# Patient Record
Sex: MALE | RURAL
[De-identification: names, ages, dates, MRNs, and addresses within clinical notes are randomized per-mention and may not be internally consistent; named-entity substitution may affect disease eponyms.]

---

## 2023-02-10 ENCOUNTER — TELEPHONE (OUTPATIENT)
Dept: CARDIOLOGY | Facility: CLINIC | Age: 68
End: 2023-02-10

## 2023-02-10 NOTE — TELEPHONE ENCOUNTER
Received voicemail on 2/6/23 at 11:43am:    Patient left message stating he was supposed to get in touch with Dr. Ora Elias. States it's not for an appointment. There are some sheets he is supposed to bring in some time this month for her to look at. He can be reached at [phone].

## 2023-03-15 ENCOUNTER — TELEPHONE (OUTPATIENT)
Dept: CARDIOLOGY | Facility: CLINIC | Age: 68
End: 2023-03-15

## 2023-03-15 NOTE — TELEPHONE ENCOUNTER
[facility] faxed over a request for a clearance on Eliquis for patients upcoming colonoscopy. Request has been scanned into patients chart.

## 2023-03-15 NOTE — TELEPHONE ENCOUNTER
Patient has had abnormal stress test in 2020.  It was mildly abnormal but I would still like to ensure there is no new symptoms or no new EKG changes prior to clearance.  Please bring in for visit

## 2023-04-13 ENCOUNTER — OFFICE VISIT (OUTPATIENT)
Dept: CARDIOLOGY | Facility: CLINIC | Age: 68
End: 2023-04-13
Payer: MEDICARE

## 2023-04-13 VITALS
SYSTOLIC BLOOD PRESSURE: 128 MMHG | OXYGEN SATURATION: 94 % | DIASTOLIC BLOOD PRESSURE: 72 MMHG | BODY MASS INDEX: 35.01 KG/M2 | HEART RATE: 83 BPM | WEIGHT: 231 LBS | HEIGHT: 68 IN

## 2023-04-13 DIAGNOSIS — G47.33 OBSTRUCTIVE SLEEP APNEA: ICD-10-CM

## 2023-04-13 DIAGNOSIS — Z79.01 CURRENT USE OF LONG TERM ANTICOAGULATION: ICD-10-CM

## 2023-04-13 DIAGNOSIS — I25.10 CORONARY ARTERY DISEASE INVOLVING NATIVE CORONARY ARTERY OF NATIVE HEART WITHOUT ANGINA PECTORIS: ICD-10-CM

## 2023-04-13 DIAGNOSIS — I10 HYPERTENSION, ESSENTIAL: ICD-10-CM

## 2023-04-13 DIAGNOSIS — Z01.810 PRE-OPERATIVE CARDIOVASCULAR EXAMINATION: Primary | ICD-10-CM

## 2023-04-13 RX ORDER — POTASSIUM CHLORIDE 1500 MG/1
1 TABLET, EXTENDED RELEASE ORAL DAILY
COMMUNITY
Start: 2023-03-13

## 2023-04-13 RX ORDER — METOPROLOL SUCCINATE 25 MG/1
1 TABLET, EXTENDED RELEASE ORAL DAILY
COMMUNITY
Start: 2023-02-26

## 2023-04-13 RX ORDER — FLUTICASONE FUROATE, UMECLIDINIUM BROMIDE AND VILANTEROL TRIFENATATE 100; 62.5; 25 UG/1; UG/1; UG/1
1 POWDER RESPIRATORY (INHALATION) DAILY
COMMUNITY
Start: 2023-03-20

## 2023-04-13 RX ORDER — MONTELUKAST SODIUM 10 MG/1
1 TABLET ORAL DAILY
COMMUNITY
Start: 2023-03-20

## 2023-04-13 RX ORDER — SIMVASTATIN 80 MG
1 TABLET ORAL DAILY
COMMUNITY
Start: 2023-01-26

## 2023-04-13 RX ORDER — APIXABAN 5 MG/1
1 TABLET, FILM COATED ORAL EVERY 12 HOURS SCHEDULED
COMMUNITY
Start: 2023-03-17 | End: 2023-04-19 | Stop reason: SDUPTHER

## 2023-04-13 RX ORDER — ALBUTEROL SULFATE 90 UG/1
AEROSOL, METERED RESPIRATORY (INHALATION)
COMMUNITY
Start: 2023-02-10

## 2023-04-13 RX ORDER — FUROSEMIDE 40 MG/1
1 TABLET ORAL DAILY
COMMUNITY
Start: 2023-02-16

## 2023-04-13 NOTE — LETTER
2023       No Recipients    Patient: Lance Madden   YOB: 1955   Date of Visit: 2023       Dear Dr. Luna Recipients:    Thank you for referring Lance Madden to me for evaluation. Below are the relevant portions of my assessment and plan of care.    If you have questions, please do not hesitate to call me. I look forward to following Lance along with you.         Sincerely,        Kassie Elias MD        CC:   No Recipients    Kassie Elias MD  [phone]  Sign when Signing Visit      [facility] Provider Note     Date:   2023   Name: Lance Madden  :   1955  PCP: Jens Perez MD    Chief Complaint   Patient presents with   • Cardiac Clearance       Subjective     History of Present Illness  Lacne Madden is a 67 y.o. male who presents today for cardiac evaluation prior to several procedures. He is having a colonoscopy on Monday and has been told he should hold his Eliquis for 2 days. He also is planning up coming dental extractions. He denies chest pain or shortness of breath. He has not been using his Bipap because he feels like it winston him. We discussed titration study but he will not do this he says because of time and cost. He wants to just stop using bipap. I discussed long term risks of untreated CARMELA including a fib, distolic CHF, cognitive impairment. But he still is not willing. He does say he has home O2 that he will start wearing at night, but I did remind him that nasal O2 cannot get past an oral obstruction.     Cardiology/Sleep History  1. Suspected CAD with abnormal stress test -fixed inferior defect on several nuclear stress tests, last  at [facility]  2. CARMELA with AHI 55 on HST 2018 - on bipap but mostly intolerant and wearning night O2  3. DVT/PE - 2016, unprovoked  4. COPD  5. DMT2  6. Echo 2022 - Ef 55-60%, RVSP 45mmHg    Allergies   Allergen Reactions   • Valacyclovir Hives   • Penicillins Other  "(See Comments)     Patient is unknown. It's been awhile since he's taken it.       Current Outpatient Medications:   •  albuterol sulfate  (90 Base) MCG/ACT inhaler, , Disp: , Rfl:   •  Eliquis 5 MG tablet tablet, Take 1 tablet by mouth Every 12 (Twelve) Hours., Disp: , Rfl:   •  furosemide (LASIX) 40 MG tablet, Take 1 tablet by mouth Daily., Disp: , Rfl:   •  KLOR-CON 20 MEQ CR tablet, Take 1 tablet by mouth Daily., Disp: , Rfl:   •  metFORMIN (GLUCOPHAGE) 500 MG tablet, Take 1 tablet by mouth Daily., Disp: , Rfl:   •  metoprolol succinate XL (TOPROL-XL) 25 MG 24 hr tablet, Take 1 tablet by mouth Daily., Disp: , Rfl:   •  montelukast (SINGULAIR) 10 MG tablet, Take 1 tablet by mouth Daily., Disp: , Rfl:   •  simvastatin (ZOCOR) 80 MG tablet, Take 1 tablet by mouth Daily., Disp: , Rfl:   •  Trelegy Ellipta 100-62.5-25 MCG/ACT inhaler, Inhale 1 puff Daily., Disp: , Rfl:     Past Medical History:   Diagnosis Date   • COPD (chronic obstructive pulmonary disease)    • DM (diabetes mellitus) 2022   • DVT (deep venous thrombosis) 2016   • Hypercholesterolemia    • CARMELA (obstructive sleep apnea)       History reviewed. No pertinent surgical history.  Family History   Problem Relation Age of Onset   • Alzheimer's disease Mother    • COPD Father      Social History     Socioeconomic History   • Marital status:    Tobacco Use   • Smoking status: Never   Substance and Sexual Activity   • Alcohol use: Never       Objective     Vital Signs:  /72 (BP Location: Left arm, Patient Position: Sitting)   Pulse 83   Ht 172.7 cm (68\")   Wt 105 kg (231 lb)   SpO2 94%   BMI 35.12 kg/m²   Estimated body mass index is 35.12 kg/m² as calculated from the following:    Height as of this encounter: 172.7 cm (68\").    Weight as of this encounter: 105 kg (231 lb).       Class 2 Severe Obesity (BMI >=35 and <=39.9). Obesity-related health conditions include the following: obstructive sleep apnea, hypertension and coronary " heart disease. Obesity is unchanged. BMI is is above average; BMI management plan is completed. We discussed portion control and increasing exercise.      Physical Exam  Vitals reviewed.   Constitutional:       General: He is not in acute distress.     Appearance: Normal appearance.   HENT:      Head: Normocephalic and atraumatic.      Mouth/Throat:      Mouth: Mucous membranes are moist.   Eyes:      Conjunctiva/sclera: Conjunctivae normal.   Neck:      Vascular: No carotid bruit.   Cardiovascular:      Rate and Rhythm: Normal rate and regular rhythm.      Pulses: Normal pulses.      Heart sounds: Normal heart sounds. No murmur heard.  Pulmonary:      Effort: Pulmonary effort is normal. No respiratory distress.      Breath sounds: Normal breath sounds. No wheezing or rhonchi.   Abdominal:      General: Abdomen is flat.      Palpations: Abdomen is soft.   Musculoskeletal:      Cervical back: Normal range of motion and neck supple.      Right lower leg: No edema.      Left lower leg: No edema.   Skin:     General: Skin is warm and dry.      Coloration: Skin is not jaundiced.   Neurological:      General: No focal deficit present.      Mental Status: He is alert and oriented to person, place, and time. Mental status is at baseline.      GCS: GCS eye subscore is 4. GCS verbal subscore is 5. GCS motor subscore is 6.      Cranial Nerves: No cranial nerve deficit.      Motor: No weakness.      Gait: Gait normal.   Psychiatric:         Mood and Affect: Mood and affect normal. Mood is not anxious.         Speech: Speech normal.         Behavior: Behavior normal.                 ECG 12 Lead    Date/Time: 4/13/2023 3:37 PM  Performed by: Kassie Elias MD  Authorized by: Kassie Elias MD   Comparison: not compared with previous ECG   Previous ECG: no previous ECG available  Rhythm: sinus rhythm  Rate: normal  Conduction: incomplete right bundle branch block  ST Segments: ST segments normal  T Waves: T waves  normal  QRS axis: normal  Other: no other findings  Lead: right-sided leads used    Clinical impression: abnormal EKG             Assessment and Plan     Diagnoses and all orders for this visit:    1. Pre-operative cardiovascular examination (Primary)  Comments:     Assessment & Plan:  No chest pain or symptoms, stable nuclear stress 2020 and stable echo 11/2022, would proceed as planned. Procedures are low risk for MACE. Because of CARMELA would recommend significant monitoring capability with sedation. I have been asked to comment on his extractions being done in dental office, I think this would be best done all at once to minimize time off blood thinners and best done with more monitoring capabilities than the average dental office (ie pulse oxemetry, heart rate, blood pressure)    Orders:  -     ECG 12 Lead    2. Coronary artery disease involving native coronary artery of native heart without angina pectoris  Comments:  Possible, versus GI attenuation on stress testing. No indication for revascularization prior to procedures as he is asymptomatic. COntinue medical therapy.     3. Obstructive sleep apnea  Comments:  Declines to continue bipap, risks understood. Will wear night time O2. CARMELA precautions for anesthesia.     4. Hypertension, essential  Comments:  controlled    5. Current use of long term anticoagulation  Comments:  Stop 2 days prior to procedures, minimize time off of anticoagulation      Recommendations: ER if symptoms increase, Sleep hygiene discussed, Sleep risks reviewed (driving, medical, sleep death, sedating agents), Report if any new/changing symptoms immediately, Limitations of preop risk stratification reviewed, CARMELA precautions for anesthesia, Minimize time off anti-platelet/anti-coagulant and Do not hold beta-blockers in the saurabh-operative period          Follow Up  No follow-ups on file.  Patient was given instructions and counseling regarding his condition or for health maintenance advice.  Please see specific information pulled into the AVS if appropriate.

## 2023-04-13 NOTE — LETTER
2023     Tulio Sparks MD  [address] [facility] [address]    Patient: Lance Madden   YOB: 1955   Date of Visit: 2023       Dear Tulio Sparks MD,    Thank you for referring Lance Madden to me for evaluation. Below is a copy of my consult note.    If you have questions, please do not hesitate to call me. I look forward to following Lance along with you.         Sincerely,        Kassie Elias MD        CC: No Recipients        [facility] Provider Note     Date:   2023   Name: Lance Madden  :   1955  PCP: Jens Perez MD    Chief Complaint   Patient presents with   • Cardiac Clearance       Subjective     History of Present Illness  Lance Madden is a 67 y.o. male who presents today for cardiac evaluation prior to several procedures. He is having a colonoscopy on Monday and has been told he should hold his Eliquis for 2 days. He also is planning up coming dental extractions. He denies chest pain or shortness of breath. He has not been using his Bipap because he feels like it winston him. We discussed titration study but he will not do this he says because of time and cost. He wants to just stop using bipap. I discussed long term risks of untreated CARMELA including a fib, distolic CHF, cognitive impairment. But he still is not willing. He does say he has home O2 that he will start wearing at night, but I did remind him that nasal O2 cannot get past an oral obstruction.     Cardiology/Sleep History  1. Suspected CAD with abnormal stress test -fixed inferior defect on several nuclear stress tests, last  at [facility]  2. CARMELA with AHI 55 on HST 2018 - on bipap but mostly intolerant and wearning night O2  3. DVT/PE - 2016, unprovoked  4. COPD  5. DMT2  6. Echo 2022 - Ef 55-60%, RVSP 45mmHg    Allergies   Allergen Reactions   • Valacyclovir Hives   • Penicillins Other (See Comments)     Patient is unknown. It's  "been awhile since he's taken it.       Current Outpatient Medications:   •  albuterol sulfate  (90 Base) MCG/ACT inhaler, , Disp: , Rfl:   •  Eliquis 5 MG tablet tablet, Take 1 tablet by mouth Every 12 (Twelve) Hours., Disp: , Rfl:   •  furosemide (LASIX) 40 MG tablet, Take 1 tablet by mouth Daily., Disp: , Rfl:   •  KLOR-CON 20 MEQ CR tablet, Take 1 tablet by mouth Daily., Disp: , Rfl:   •  metFORMIN (GLUCOPHAGE) 500 MG tablet, Take 1 tablet by mouth Daily., Disp: , Rfl:   •  metoprolol succinate XL (TOPROL-XL) 25 MG 24 hr tablet, Take 1 tablet by mouth Daily., Disp: , Rfl:   •  montelukast (SINGULAIR) 10 MG tablet, Take 1 tablet by mouth Daily., Disp: , Rfl:   •  simvastatin (ZOCOR) 80 MG tablet, Take 1 tablet by mouth Daily., Disp: , Rfl:   •  Trelegy Ellipta 100-62.5-25 MCG/ACT inhaler, Inhale 1 puff Daily., Disp: , Rfl:     Past Medical History:   Diagnosis Date   • COPD (chronic obstructive pulmonary disease)    • DM (diabetes mellitus) 2022   • DVT (deep venous thrombosis) 2016   • Hypercholesterolemia    • CARMELA (obstructive sleep apnea)       History reviewed. No pertinent surgical history.  Family History   Problem Relation Age of Onset   • Alzheimer's disease Mother    • COPD Father      Social History     Socioeconomic History   • Marital status:    Tobacco Use   • Smoking status: Never   Substance and Sexual Activity   • Alcohol use: Never       Objective     Vital Signs:  /72 (BP Location: Left arm, Patient Position: Sitting)   Pulse 83   Ht 172.7 cm (68\")   Wt 105 kg (231 lb)   SpO2 94%   BMI 35.12 kg/m²   Estimated body mass index is 35.12 kg/m² as calculated from the following:    Height as of this encounter: 172.7 cm (68\").    Weight as of this encounter: 105 kg (231 lb).       Class 2 Severe Obesity (BMI >=35 and <=39.9). Obesity-related health conditions include the following: obstructive sleep apnea, hypertension and coronary heart disease. Obesity is unchanged. BMI is is " above average; BMI management plan is completed. We discussed portion control and increasing exercise.      Physical Exam  Vitals reviewed.   Constitutional:       General: He is not in acute distress.     Appearance: Normal appearance.   HENT:      Head: Normocephalic and atraumatic.      Mouth/Throat:      Mouth: Mucous membranes are moist.   Eyes:      Conjunctiva/sclera: Conjunctivae normal.   Neck:      Vascular: No carotid bruit.   Cardiovascular:      Rate and Rhythm: Normal rate and regular rhythm.      Pulses: Normal pulses.      Heart sounds: Normal heart sounds. No murmur heard.  Pulmonary:      Effort: Pulmonary effort is normal. No respiratory distress.      Breath sounds: Normal breath sounds. No wheezing or rhonchi.   Abdominal:      General: Abdomen is flat.      Palpations: Abdomen is soft.   Musculoskeletal:      Cervical back: Normal range of motion and neck supple.      Right lower leg: No edema.      Left lower leg: No edema.   Skin:     General: Skin is warm and dry.      Coloration: Skin is not jaundiced.   Neurological:      General: No focal deficit present.      Mental Status: He is alert and oriented to person, place, and time. Mental status is at baseline.      GCS: GCS eye subscore is 4. GCS verbal subscore is 5. GCS motor subscore is 6.      Cranial Nerves: No cranial nerve deficit.      Motor: No weakness.      Gait: Gait normal.   Psychiatric:         Mood and Affect: Mood and affect normal. Mood is not anxious.         Speech: Speech normal.         Behavior: Behavior normal.                 ECG 12 Lead    Date/Time: 4/13/2023 3:37 PM  Performed by: Kassie Elias MD  Authorized by: Kassie Elias MD   Comparison: not compared with previous ECG   Previous ECG: no previous ECG available  Rhythm: sinus rhythm  Rate: normal  Conduction: incomplete right bundle branch block  ST Segments: ST segments normal  T Waves: T waves normal  QRS axis: normal  Other: no other  findings  Lead: right-sided leads used    Clinical impression: abnormal EKG             Assessment and Plan     Diagnoses and all orders for this visit:    1. Pre-operative cardiovascular examination (Primary)  Comments:     Assessment & Plan:  No chest pain or symptoms, stable nuclear stress 2020 and stable echo 11/2022, would proceed as planned. Procedures are low risk for MACE. Because of CARMELA would recommend significant monitoring capability with sedation. I have been asked to comment on his extractions being done in dental office, I think this would be best done all at once to minimize time off blood thinners and best done with more monitoring capabilities than the average dental office (ie pulse oxemetry, heart rate, blood pressure)    Orders:  -     ECG 12 Lead    2. Coronary artery disease involving native coronary artery of native heart without angina pectoris  Comments:  Possible, versus GI attenuation on stress testing. No indication for revascularization prior to procedures as he is asymptomatic. COntinue medical therapy.     3. Obstructive sleep apnea  Comments:  Declines to continue bipap, risks understood. Will wear night time O2. CARMELA precautions for anesthesia.     4. Hypertension, essential  Comments:  controlled    5. Current use of long term anticoagulation  Comments:  Stop 2 days prior to procedures, minimize time off of anticoagulation      Recommendations: ER if symptoms increase, Sleep hygiene discussed, Sleep risks reviewed (driving, medical, sleep death, sedating agents), Report if any new/changing symptoms immediately, Limitations of preop risk stratification reviewed, CARMELA precautions for anesthesia, Minimize time off anti-platelet/anti-coagulant and Do not hold beta-blockers in the saurabh-operative period          Follow Up  No follow-ups on file.  Patient was given instructions and counseling regarding his condition or for health maintenance advice. Please see specific information pulled  into the AVS if appropriate.

## 2023-04-13 NOTE — PROGRESS NOTES
[facility] Provider Note     Date:   2023   Name: Lance Madden  :   1955  PCP: Jens Perez MD    Chief Complaint   Patient presents with   • Cardiac Clearance       Subjective     History of Present Illness  Lance Madden is a 67 y.o. male who presents today for cardiac evaluation prior to several procedures. He is having a colonoscopy on Monday and has been told he should hold his Eliquis for 2 days. He also is planning up coming dental extractions. He denies chest pain or shortness of breath. He has not been using his Bipap because he feels like it winston him. We discussed titration study but he will not do this he says because of time and cost. He wants to just stop using bipap. I discussed long term risks of untreated CARMELA including a fib, distolic CHF, cognitive impairment. But he still is not willing. He does say he has home O2 that he will start wearing at night, but I did remind him that nasal O2 cannot get past an oral obstruction.     Cardiology/Sleep History  1. Suspected CAD with abnormal stress test -fixed inferior defect on several nuclear stress tests, last  at [facility]  2. CARMELA with AHI 55 on HST 2018 - on bipap but mostly intolerant and wearning night O2  3. DVT/PE - , unprovoked  4. COPD  5. DMT2  6. Echo 2022 - Ef 55-60%, RVSP 45mmHg    Allergies   Allergen Reactions   • Valacyclovir Hives   • Penicillins Other (See Comments)     Patient is unknown. It's been awhile since he's taken it.       Current Outpatient Medications:   •  albuterol sulfate  (90 Base) MCG/ACT inhaler, , Disp: , Rfl:   •  Eliquis 5 MG tablet tablet, Take 1 tablet by mouth Every 12 (Twelve) Hours., Disp: , Rfl:   •  furosemide (LASIX) 40 MG tablet, Take 1 tablet by mouth Daily., Disp: , Rfl:   •  KLOR-CON 20 MEQ CR tablet, Take 1 tablet by mouth Daily., Disp: , Rfl:   •  metFORMIN (GLUCOPHAGE) 500 MG tablet, Take 1 tablet by mouth Daily., Disp: , Rfl:   •   "metoprolol succinate XL (TOPROL-XL) 25 MG 24 hr tablet, Take 1 tablet by mouth Daily., Disp: , Rfl:   •  montelukast (SINGULAIR) 10 MG tablet, Take 1 tablet by mouth Daily., Disp: , Rfl:   •  simvastatin (ZOCOR) 80 MG tablet, Take 1 tablet by mouth Daily., Disp: , Rfl:   •  Trelegy Ellipta 100-62.5-25 MCG/ACT inhaler, Inhale 1 puff Daily., Disp: , Rfl:     Past Medical History:   Diagnosis Date   • COPD (chronic obstructive pulmonary disease)    • DM (diabetes mellitus) 2022   • DVT (deep venous thrombosis) 2016   • Hypercholesterolemia    • CARMELA (obstructive sleep apnea)       History reviewed. No pertinent surgical history.  Family History   Problem Relation Age of Onset   • Alzheimer's disease Mother    • COPD Father      Social History     Socioeconomic History   • Marital status:    Tobacco Use   • Smoking status: Never   Substance and Sexual Activity   • Alcohol use: Never       Objective     Vital Signs:  /72 (BP Location: Left arm, Patient Position: Sitting)   Pulse 83   Ht 172.7 cm (68\")   Wt 105 kg (231 lb)   SpO2 94%   BMI 35.12 kg/m²   Estimated body mass index is 35.12 kg/m² as calculated from the following:    Height as of this encounter: 172.7 cm (68\").    Weight as of this encounter: 105 kg (231 lb).       Class 2 Severe Obesity (BMI >=35 and <=39.9). Obesity-related health conditions include the following: obstructive sleep apnea, hypertension and coronary heart disease. Obesity is unchanged. BMI is is above average; BMI management plan is completed. We discussed portion control and increasing exercise.      Physical Exam  Vitals reviewed.   Constitutional:       General: He is not in acute distress.     Appearance: Normal appearance.   HENT:      Head: Normocephalic and atraumatic.      Mouth/Throat:      Mouth: Mucous membranes are moist.   Eyes:      Conjunctiva/sclera: Conjunctivae normal.   Neck:      Vascular: No carotid bruit.   Cardiovascular:      Rate and Rhythm: Normal " rate and regular rhythm.      Pulses: Normal pulses.      Heart sounds: Normal heart sounds. No murmur heard.  Pulmonary:      Effort: Pulmonary effort is normal. No respiratory distress.      Breath sounds: Normal breath sounds. No wheezing or rhonchi.   Abdominal:      General: Abdomen is flat.      Palpations: Abdomen is soft.   Musculoskeletal:      Cervical back: Normal range of motion and neck supple.      Right lower leg: No edema.      Left lower leg: No edema.   Skin:     General: Skin is warm and dry.      Coloration: Skin is not jaundiced.   Neurological:      General: No focal deficit present.      Mental Status: He is alert and oriented to person, place, and time. Mental status is at baseline.      GCS: GCS eye subscore is 4. GCS verbal subscore is 5. GCS motor subscore is 6.      Cranial Nerves: No cranial nerve deficit.      Motor: No weakness.      Gait: Gait normal.   Psychiatric:         Mood and Affect: Mood and affect normal. Mood is not anxious.         Speech: Speech normal.         Behavior: Behavior normal.                 ECG 12 Lead    Date/Time: 4/13/2023 3:37 PM  Performed by: Kassie Elias MD  Authorized by: Kassie Elias MD   Comparison: not compared with previous ECG   Previous ECG: no previous ECG available  Rhythm: sinus rhythm  Rate: normal  Conduction: incomplete right bundle branch block  ST Segments: ST segments normal  T Waves: T waves normal  QRS axis: normal  Other: no other findings  Lead: right-sided leads used    Clinical impression: abnormal EKG             Assessment and Plan     Diagnoses and all orders for this visit:    1. Pre-operative cardiovascular examination (Primary)  Comments:     Assessment & Plan:  No chest pain or symptoms, stable nuclear stress 2020 and stable echo 11/2022, would proceed as planned. Procedures are low risk for MACE. Because of CARMELA would recommend significant monitoring capability with sedation. I have been asked to comment on his  extractions being done in dental office, I think this would be best done all at once to minimize time off blood thinners and best done with more monitoring capabilities than the average dental office (ie pulse oxemetry, heart rate, blood pressure)    Orders:  -     ECG 12 Lead    2. Coronary artery disease involving native coronary artery of native heart without angina pectoris  Comments:  Possible, versus GI attenuation on stress testing. No indication for revascularization prior to procedures as he is asymptomatic. COntinue medical therapy.     3. Obstructive sleep apnea  Comments:  Declines to continue bipap, risks understood. Will wear night time O2. CARMELA precautions for anesthesia.     4. Hypertension, essential  Comments:  controlled    5. Current use of long term anticoagulation  Comments:  Stop 2 days prior to procedures, minimize time off of anticoagulation      Recommendations: ER if symptoms increase, Sleep hygiene discussed, Sleep risks reviewed (driving, medical, sleep death, sedating agents), Report if any new/changing symptoms immediately, Limitations of preop risk stratification reviewed, CARMELA precautions for anesthesia, Minimize time off anti-platelet/anti-coagulant and Do not hold beta-blockers in the saurabh-operative period          Follow Up  No follow-ups on file.  Patient was given instructions and counseling regarding his condition or for health maintenance advice. Please see specific information pulled into the AVS if appropriate.

## 2023-04-14 PROBLEM — Z01.810 PRE-OPERATIVE CARDIOVASCULAR EXAMINATION: Status: ACTIVE | Noted: 2023-04-14

## 2023-04-14 NOTE — ASSESSMENT & PLAN NOTE
No chest pain or symptoms, stable nuclear stress 2020 and stable echo 11/2022, would proceed as planned. Procedures are low risk for MACE. Because of CARMELA would recommend significant monitoring capability with sedation. I have been asked to comment on his extractions being done in dental office, I think this would be best done all at once to minimize time off blood thinners and best done with more monitoring capabilities than the average dental office (ie pulse oxemetry, heart rate, blood pressure)

## 2023-04-19 RX ORDER — APIXABAN 5 MG/1
5 TABLET, FILM COATED ORAL EVERY 12 HOURS SCHEDULED
Qty: 180 TABLET | Refills: 1 | Status: SHIPPED | OUTPATIENT
Start: 2023-04-19

## 2023-06-13 RX ORDER — POTASSIUM CHLORIDE 1500 MG/1
20 TABLET, EXTENDED RELEASE ORAL DAILY
Qty: 90 TABLET | Refills: 1 | Status: SHIPPED | OUTPATIENT
Start: 2023-06-13

## 2023-06-16 ENCOUNTER — TELEPHONE (OUTPATIENT)
Dept: CARDIOLOGY | Facility: CLINIC | Age: 68
End: 2023-06-16
Payer: MEDICARE

## 2023-06-16 NOTE — TELEPHONE ENCOUNTER
Pharmacy called and stated they was needed to speak to someone about there meds. They was needing to switch one to a generic since they have been having a hard time getting the brand name

## 2023-08-22 ENCOUNTER — TELEPHONE (OUTPATIENT)
Dept: CARDIOLOGY | Facility: CLINIC | Age: 68
End: 2023-08-22
Payer: MEDICARE

## 2023-08-22 NOTE — TELEPHONE ENCOUNTER
With his history of unprovoked blood clots I am nervous to stop it at all. I would recommend no more than 2 days off, but would have him ask his dentist if the procedure can be done on blood thinners, maybe only hold the morning of the procedure.

## 2023-08-22 NOTE — TELEPHONE ENCOUNTER
Patient is having a dental procedure and wants to know how many days he needs to be off his Eliquis.

## 2023-08-22 NOTE — TELEPHONE ENCOUNTER
Patient called and left a voicemail stating he needs to speak with someone regarding his eliquis usage. Please call back at [phone].

## 2023-08-28 ENCOUNTER — TELEPHONE (OUTPATIENT)
Dept: CARDIOLOGY | Facility: CLINIC | Age: 68
End: 2023-08-28
Payer: MEDICARE

## 2023-08-28 NOTE — TELEPHONE ENCOUNTER
Dr Christian Adair, Dentist, called stating he needs to pull a couple of teeth for Mr. Madden and was needing to know if he could hold his Eliquis and for how long before procedure. He has not been seen since April so I was unsure if he needed to be seen first before clearance.

## 2023-08-28 NOTE — TELEPHONE ENCOUNTER
Mr. Madden has had an unprovoked clot in his legs so I would not recommend holding blood thinners for the procedure.  Occasionally they will hold blood thinners the morning of the procedure only and go back on the next day.  But I would not be comfortable with much more than that.  HUB okay to read.

## 2023-08-29 NOTE — TELEPHONE ENCOUNTER
HUB SHARED INFORMATION WITH PATIENT AND VOICED UNDERSTANDING TO HOLD THE DAY OF PROCEDURE AND CONTINUE NEXT DAY. PATIENT WILL BE CALLING DR RICHARD OFFICE TO RELAY INFO.

## 2023-10-20 RX ORDER — APIXABAN 5 MG/1
5 TABLET, FILM COATED ORAL EVERY 12 HOURS
Qty: 180 TABLET | Refills: 0 | Status: SHIPPED | OUTPATIENT
Start: 2023-10-20

## 2023-11-09 ENCOUNTER — OFFICE VISIT (OUTPATIENT)
Dept: CARDIOLOGY | Facility: CLINIC | Age: 68
End: 2023-11-09
Payer: MEDICARE

## 2023-11-09 VITALS
HEART RATE: 83 BPM | OXYGEN SATURATION: 95 % | WEIGHT: 230 LBS | SYSTOLIC BLOOD PRESSURE: 124 MMHG | BODY MASS INDEX: 34.86 KG/M2 | HEIGHT: 68 IN | DIASTOLIC BLOOD PRESSURE: 64 MMHG

## 2023-11-09 DIAGNOSIS — I10 HYPERTENSION, ESSENTIAL: ICD-10-CM

## 2023-11-09 DIAGNOSIS — I25.10 CORONARY ARTERY DISEASE INVOLVING NATIVE CORONARY ARTERY OF NATIVE HEART WITHOUT ANGINA PECTORIS: ICD-10-CM

## 2023-11-09 DIAGNOSIS — G47.33 OSA (OBSTRUCTIVE SLEEP APNEA): Primary | ICD-10-CM

## 2023-11-09 PROCEDURE — 99214 OFFICE O/P EST MOD 30 MIN: CPT | Performed by: NURSE PRACTITIONER

## 2023-11-09 PROCEDURE — 3078F DIAST BP <80 MM HG: CPT | Performed by: NURSE PRACTITIONER

## 2023-11-09 PROCEDURE — 1159F MED LIST DOCD IN RCRD: CPT | Performed by: NURSE PRACTITIONER

## 2023-11-09 PROCEDURE — 3074F SYST BP LT 130 MM HG: CPT | Performed by: NURSE PRACTITIONER

## 2023-11-09 PROCEDURE — 1160F RVW MEDS BY RX/DR IN RCRD: CPT | Performed by: NURSE PRACTITIONER

## 2023-11-09 NOTE — PROGRESS NOTES
[facility] Provider Note     Date:   2023   Name: Lance Madden  :   1955  PCP: Jens Perez MD    Chief Complaint   Patient presents with    Coronary Artery Disease       Subjective     History of Present Illness  Lance Madden is a 68 y.o. male who presents today for 6-month follow-up visit.  Patient has a history of CARMELA and is on BiPAP therapy.  He reports that he has not used his BiPAP in over 6 months because his Robert replacement device that was delivered on his porch got stolen. He is in the process of trying to get it replaced.  He denies excessive daytime sleepiness or fatigue, he feels like he is doing well without his BiPAP device.  I offered to send an order for new device but he would like to think about it first and let me know what he decides.  He denies any current symptoms or complaints today.  He denies chest pain, shortness of breath, palpitations, dizziness, lower extremity edema or syncope.  He has a history of DVT/PE in 2016 and is on Eliquis.  He has suspected CAD due to several abnormal stress tests.  He has no new concerns today and is overall doing well.    Cardiology/Sleep History  1. Suspected CAD with abnormal stress test -fixed inferior defect on several nuclear stress tests, last  at [facility]  2. CARMELA with AHI 55 on HST 2018 - on bipap but mostly intolerant   3. DVT/PE - 2016, unprovoked  4. COPD  5. DMT2  6. Echo 2022 - Ef 55-60%, RVSP 45mmHg     Reports Denies   Chest Pain [] [x]   Shortness of Air [] [x]   Palpitations [] [x]   Edema [] [x]   Dizziness [] [x]   Syncope [] [x]       Allergies   Allergen Reactions    Valacyclovir Hives    Penicillins Other (See Comments)     Patient is unknown. It's been awhile since he's taken it.       Current Outpatient Medications:     albuterol sulfate  (90 Base) MCG/ACT inhaler, , Disp: , Rfl:     Eliquis 5 MG tablet tablet, TAKE 1 TABLET BY MOUTH EVERY 12 HOURS, Disp: 180  "tablet, Rfl: 0    furosemide (LASIX) 40 MG tablet, Take 1 tablet by mouth Daily., Disp: , Rfl:     KLOR-CON 20 MEQ CR tablet, Take 1 tablet by mouth Daily., Disp: 90 tablet, Rfl: 1    metFORMIN (GLUCOPHAGE) 500 MG tablet, Take 1 tablet by mouth Daily., Disp: , Rfl:     metoprolol succinate XL (TOPROL-XL) 25 MG 24 hr tablet, Take 1 tablet by mouth Daily., Disp: 90 tablet, Rfl: 1    montelukast (SINGULAIR) 10 MG tablet, Take 1 tablet by mouth Daily., Disp: , Rfl:     simvastatin (ZOCOR) 80 MG tablet, Take 1 tablet by mouth Daily., Disp: , Rfl:     Trelegy Ellipta 100-62.5-25 MCG/ACT inhaler, Inhale 1 puff Daily., Disp: , Rfl:     Past Medical History:   Diagnosis Date    COPD (chronic obstructive pulmonary disease)     DM (diabetes mellitus) 2022    DVT (deep venous thrombosis) 2016    Hypercholesterolemia     CARMELA (obstructive sleep apnea) 11/9/2023      Past Surgical History:   Procedure Laterality Date    COLONOSCOPY  2023     Family History   Problem Relation Age of Onset    Alzheimer's disease Mother     COPD Father      Social History     Socioeconomic History    Marital status:    Tobacco Use    Smoking status: Never     Passive exposure: Never    Smokeless tobacco: Never   Vaping Use    Vaping Use: Never used   Substance and Sexual Activity    Alcohol use: Never    Drug use: Not Currently    Sexual activity: Defer       Objective     Vital Signs:  /64   Pulse 83   Ht 172.7 cm (68\")   Wt 104 kg (230 lb)   SpO2 95%   BMI 34.97 kg/m²   Estimated body mass index is 34.97 kg/m² as calculated from the following:    Height as of this encounter: 172.7 cm (68\").    Weight as of this encounter: 104 kg (230 lb).               Physical Exam  Vitals reviewed.   Constitutional:       Appearance: Normal appearance.   HENT:      Head: Normocephalic.   Cardiovascular:      Rate and Rhythm: Normal rate and regular rhythm.      Heart sounds: Normal heart sounds.   Pulmonary:      Effort: Pulmonary effort is " normal.      Breath sounds: Normal breath sounds.   Musculoskeletal:      Right lower leg: No edema.      Left lower leg: No edema.   Skin:     General: Skin is warm and dry.      Capillary Refill: Capillary refill takes less than 2 seconds.   Neurological:      General: No focal deficit present.      Mental Status: He is alert and oriented to person, place, and time.   Psychiatric:         Mood and Affect: Mood normal.         Behavior: Behavior normal.                     Assessment and Plan     Diagnoses and all orders for this visit:    1. CARMELA (obstructive sleep apnea) (Primary)  Assessment & Plan:  He has not used his BiPAP in over 6 months because his Robert replacement device that was delivered on his porch got stolen. He is in the process of trying to get it replaced.  He denies excessive daytime sleepiness or fatigue, he feels like he is doing well without his BiPAP device.  I offered to send an order for new device but he would like to think about it first and let me know what he decides.      2. Coronary artery disease involving native coronary artery of native heart without angina pectoris  Assessment & Plan:  Possible, versus GI attenuation on stress testing.  Patient is asymptomatic, he denies chest pain, shortness of breath or fatigue.      3. Hypertension, essential  Assessment & Plan:  Hypertension is  Stable .  Continue current treatment regimen.  Dietary sodium restriction.  Weight loss.  Blood pressure will be reassessed at the next regular appointment.          Recommendations: Report if any new/changing symptoms immediately and Increase pap therapy usage          Follow Up  Return in about 6 months (around 5/9/2024) for CARMELA.  Patient was given instructions and counseling regarding his condition or for health maintenance advice. Please see specific information pulled into the AVS if appropriate.

## 2023-11-10 NOTE — ASSESSMENT & PLAN NOTE
Hypertension is  Stable .  Continue current treatment regimen.  Dietary sodium restriction.  Weight loss.  Blood pressure will be reassessed at the next regular appointment.

## 2023-11-10 NOTE — ASSESSMENT & PLAN NOTE
He has not used his BiPAP in over 6 months because his Robert replacement device that was delivered on his porch got stolen. He is in the process of trying to get it replaced.  He denies excessive daytime sleepiness or fatigue, he feels like he is doing well without his BiPAP device.  I offered to send an order for new device but he would like to think about it first and let me know what he decides.

## 2023-11-10 NOTE — ASSESSMENT & PLAN NOTE
Possible, versus GI attenuation on stress testing.  Patient is asymptomatic, he denies chest pain, shortness of breath or fatigue.

## 2023-12-04 ENCOUNTER — TELEPHONE (OUTPATIENT)
Dept: CARDIOLOGY | Facility: CLINIC | Age: 68
End: 2023-12-04
Payer: MEDICARE

## 2023-12-04 RX ORDER — POTASSIUM CHLORIDE 20 MEQ/1
20 TABLET, EXTENDED RELEASE ORAL DAILY
Qty: 90 TABLET | Refills: 0 | Status: SHIPPED | OUTPATIENT
Start: 2023-12-04

## 2023-12-04 NOTE — TELEPHONE ENCOUNTER
PT WAS THINKING THAT HE SHOULD BE DOING AN ECHO SOON AS HIS LAST WAS 11/2022.  WOULD YOU LOOK THROUGH HIS CHART AND IF YOU SEE FIT, PLACE AN ORDER FOR ONE?  THANKS

## 2023-12-18 RX ORDER — METOPROLOL SUCCINATE 25 MG/1
25 TABLET, EXTENDED RELEASE ORAL DAILY
Qty: 90 TABLET | Refills: 3 | Status: SHIPPED | OUTPATIENT
Start: 2023-12-18

## 2024-01-15 RX ORDER — APIXABAN 5 MG/1
5 TABLET, FILM COATED ORAL EVERY 12 HOURS
Qty: 180 TABLET | Refills: 3 | Status: SHIPPED | OUTPATIENT
Start: 2024-01-15

## 2024-03-04 RX ORDER — POTASSIUM CHLORIDE 20 MEQ/1
20 TABLET, EXTENDED RELEASE ORAL DAILY
Qty: 90 TABLET | Refills: 0 | Status: SHIPPED | OUTPATIENT
Start: 2024-03-04

## 2024-03-04 NOTE — TELEPHONE ENCOUNTER
I called PCP and last labs were early last year. Raysa in medical records is faxing those over now.

## 2024-06-24 RX ORDER — POTASSIUM CHLORIDE 20 MEQ/1
20 TABLET, EXTENDED RELEASE ORAL DAILY
Qty: 90 TABLET | Refills: 0 | Status: SHIPPED | OUTPATIENT
Start: 2024-06-24

## 2024-07-30 ENCOUNTER — OFFICE VISIT (OUTPATIENT)
Dept: CARDIOLOGY | Facility: CLINIC | Age: 69
End: 2024-07-30
Payer: MEDICARE

## 2024-07-30 VITALS
WEIGHT: 233 LBS | BODY MASS INDEX: 35.31 KG/M2 | HEART RATE: 86 BPM | HEIGHT: 68 IN | SYSTOLIC BLOOD PRESSURE: 132 MMHG | DIASTOLIC BLOOD PRESSURE: 82 MMHG | OXYGEN SATURATION: 99 %

## 2024-07-30 DIAGNOSIS — G47.33 OSA (OBSTRUCTIVE SLEEP APNEA): ICD-10-CM

## 2024-07-30 DIAGNOSIS — I10 HYPERTENSION, ESSENTIAL: ICD-10-CM

## 2024-07-30 DIAGNOSIS — I25.10 CORONARY ARTERY DISEASE INVOLVING NATIVE CORONARY ARTERY OF NATIVE HEART WITHOUT ANGINA PECTORIS: ICD-10-CM

## 2024-07-30 PROCEDURE — 3079F DIAST BP 80-89 MM HG: CPT | Performed by: NURSE PRACTITIONER

## 2024-07-30 PROCEDURE — 93000 ELECTROCARDIOGRAM COMPLETE: CPT | Performed by: NURSE PRACTITIONER

## 2024-07-30 PROCEDURE — 99213 OFFICE O/P EST LOW 20 MIN: CPT | Performed by: NURSE PRACTITIONER

## 2024-07-30 PROCEDURE — 3075F SYST BP GE 130 - 139MM HG: CPT | Performed by: NURSE PRACTITIONER

## 2024-07-30 RX ORDER — FLUTICASONE PROPIONATE 50 MCG
2 SPRAY, SUSPENSION (ML) NASAL DAILY
Qty: 16 G | Refills: 11 | Status: SHIPPED | OUTPATIENT
Start: 2024-07-30

## 2024-07-30 NOTE — PROGRESS NOTES
[facility] Provider Note     Date:   2024   Name: Lance Madden  :   1955  PCP: Jens Perez MD    Chief Complaint   Patient presents with    Sleep Apnea       Subjective     History of Present Illness  Lance Madden is a 68 y.o. male who presents today for 31 to 90-day follow-up on known CARMELA after getting a new machine from the recall.  Recent download reviewed showing patient uses his machine 73% of the time, over 4 hours 60% of the time, for an overall AHI of 3.5.  He does benefit from PAP therapy when he uses it.  Will plan to continue.  Advised patient not to drive when tired.  He reports he is unable to wear his machine over 4 hours most nights due to having a runny nose.  He takes an allergy pill that he has at home but he is unsure of the name.  He also takes Singulair and albuterol for asthma.  Will add trial of Flonase nasal spray to see if that helps.    No chest pain or shortness of air.  He is doing fine on his Eliquis for history of DVT and PE in 2016.  He does not need refills today.    Labs are up-to-date.    We will see him back in 3 months for CARMELA compliance report visit.  Sooner if needed.              DME: [facility]    Cardiology/Sleep History  1. Suspected CAD with abnormal stress test -fixed inferior defect on several nuclear stress tests, last  at [facility]  2. CARMELA with AHI 55 on HST 2018 - on bipap but mostly intolerant   3. DVT/PE - 2016, unprovoked  4. COPD  5. DMT2  6. Echo 2022 - Ef 55-60%, RVSP 45mmHg    2024 EKG-sinus rhythm.  Incomplete right bundle branch block.  Heart rate 95.    3/ labs-A1c 6.8, cholesterol 178, triglycerides 206, HDL 54, LDL 89, creatinine 0.56, potassium normal, sodium normal, TSH and T4 free normal    Allergies   Allergen Reactions    Valacyclovir Hives    Penicillins Other (See Comments)     Patient is unknown. It's been awhile since he's taken it.       Current Outpatient  "Medications:     albuterol sulfate  (90 Base) MCG/ACT inhaler, , Disp: , Rfl:     Eliquis 5 MG tablet tablet, TAKE 1 TABLET BY MOUTH EVERY 12 HOURS, Disp: 180 tablet, Rfl: 3    furosemide (LASIX) 40 MG tablet, Take 1 tablet by mouth Daily., Disp: , Rfl:     metFORMIN (GLUCOPHAGE) 500 MG tablet, Take 1 tablet by mouth Daily., Disp: , Rfl:     metoprolol succinate XL (TOPROL-XL) 25 MG 24 hr tablet, Take 1 tablet by mouth once daily, Disp: 90 tablet, Rfl: 3    montelukast (SINGULAIR) 10 MG tablet, Take 1 tablet by mouth Daily., Disp: , Rfl:     potassium chloride (KLOR-CON M20) 20 MEQ CR tablet, Take 1 tablet by mouth Daily., Disp: 90 tablet, Rfl: 0    simvastatin (ZOCOR) 80 MG tablet, Take 1 tablet by mouth Daily., Disp: , Rfl:     Trelegy Ellipta 100-62.5-25 MCG/ACT inhaler, Inhale 1 puff Daily., Disp: , Rfl:     fluticasone (FLONASE) 50 MCG/ACT nasal spray, 2 sprays into the nostril(s) as directed by provider Daily., Disp: 16 g, Rfl: 11    Homeopathic Products (ALLERGY BUSTER NA), 1 tablet/day into the nostril(s) as directed by provider Daily., Disp: , Rfl:     Past Medical History:   Diagnosis Date    COPD (chronic obstructive pulmonary disease)     DM (diabetes mellitus) 2022    DVT (deep venous thrombosis) 2016    Hypercholesterolemia     CARMELA (obstructive sleep apnea) 11/9/2023      Past Surgical History:   Procedure Laterality Date    COLONOSCOPY  2023     Family History   Problem Relation Age of Onset    Alzheimer's disease Mother     COPD Father      Social History     Socioeconomic History    Marital status:    Tobacco Use    Smoking status: Never     Passive exposure: Never    Smokeless tobacco: Never   Vaping Use    Vaping status: Never Used   Substance and Sexual Activity    Alcohol use: Never    Drug use: Not Currently    Sexual activity: Defer       Objective     Vital Signs:  /82   Pulse 86   Ht 172.7 cm (68\")   Wt 106 kg (233 lb)   SpO2 99%   BMI 35.43 kg/m²   Estimated body " "mass index is 35.43 kg/m² as calculated from the following:    Height as of this encounter: 172.7 cm (68\").    Weight as of this encounter: 106 kg (233 lb).         Physical Exam  Vitals reviewed.   Constitutional:       General: He is not in acute distress.  HENT:      Head: Normocephalic.   Eyes:      General: No scleral icterus.  Cardiovascular:      Rate and Rhythm: Normal rate.   Pulmonary:      Effort: Pulmonary effort is normal.   Musculoskeletal:         General: Normal range of motion.      Cervical back: Normal range of motion.   Skin:     General: Skin is warm and dry.   Neurological:      Mental Status: He is alert and oriented to person, place, and time.   Psychiatric:         Mood and Affect: Mood normal.         Thought Content: Thought content normal.                 ECG 12 Lead    Date/Time: 7/30/2024 1:57 PM  Performed by: Lisset Vang APRN    Authorized by: Lisset Vang APRN  Comparison: compared with previous ECG from 4/13/2023  Similar to previous ECG  Rhythm: sinus rhythm  BPM: 95  Conduction: incomplete right bundle branch block    Clinical impression: abnormal EKG           Assessment and Plan     Diagnoses and all orders for this visit:    1. CARMELA (obstructive sleep apnea)  Comments:  Increase PAP usage.  Recheck in 3 months.  Use Flonase to help with runny nose.  Orders:  -     PAP Therapy  -     ECG 12 Lead    2. Coronary artery disease involving native coronary artery of native heart without angina pectoris  Comments:  Stable.  On medical therapy.  EKG updated today.  Orders:  -     ECG 12 Lead    3. Hypertension, essential  Comments:  At goal.  EKG updated today.  Orders:  -     ECG 12 Lead    Other orders  -     fluticasone (FLONASE) 50 MCG/ACT nasal spray; 2 sprays into the nostril(s) as directed by provider Daily.  Dispense: 16 g; Refill: 11        Recommendations: ER if symptoms increase, Report if any new/changing symptoms immediately, Sleep risks reviewed " (driving, medical, sleep death, sedating agents), Sleep hygiene discussed, and Increase pap therapy usage      Follow Up  Return in about 3 months (around 10/30/2024) for CARMELA recheck.    Lisset Vang, APRN   07/30/2024     Please note that this explicitly excludes time spent on other separate billable services such as performing procedures or test interpretation, when applicable.    This note was created using dictation software which occasionally transcribes nonsensical phrases. Please contact the provider if any clarification is needed.

## 2024-09-18 RX ORDER — POTASSIUM CHLORIDE 1500 MG/1
TABLET, EXTENDED RELEASE ORAL DAILY
Qty: 90 TABLET | Refills: 0 | Status: SHIPPED | OUTPATIENT
Start: 2024-09-18

## 2024-10-29 ENCOUNTER — OFFICE VISIT (OUTPATIENT)
Dept: CARDIOLOGY | Facility: CLINIC | Age: 69
End: 2024-10-29
Payer: MEDICARE

## 2024-10-29 VITALS
HEART RATE: 96 BPM | WEIGHT: 231 LBS | HEIGHT: 68 IN | DIASTOLIC BLOOD PRESSURE: 72 MMHG | OXYGEN SATURATION: 94 % | SYSTOLIC BLOOD PRESSURE: 122 MMHG | BODY MASS INDEX: 35.01 KG/M2

## 2024-10-29 DIAGNOSIS — G47.33 OSA (OBSTRUCTIVE SLEEP APNEA): Primary | ICD-10-CM

## 2024-10-29 PROCEDURE — 3074F SYST BP LT 130 MM HG: CPT | Performed by: NURSE PRACTITIONER

## 2024-10-29 PROCEDURE — 99213 OFFICE O/P EST LOW 20 MIN: CPT | Performed by: NURSE PRACTITIONER

## 2024-10-29 PROCEDURE — 3078F DIAST BP <80 MM HG: CPT | Performed by: NURSE PRACTITIONER

## 2024-10-29 NOTE — PROGRESS NOTES
Cardiovascular [facility]ing Provider Note     Date:   10/02/2024   Name: Lance Madden  :   1955  PCP: Jens Perez MD         History of Present Illness    Lance Madden is a 69 y.o. male who presents today for follow-up on known CARMELA.  Today he reports he feels like his starting pressures are too high he would like his minimum EPAP changed from 10-8.  Will make this change and see him back in 6 weeks.  He is going to take his machine to make sure the change is made.    Do not drive tired.    We will see him back in 6 weeks for CARMELA follow-up and regular cardiac appointment.    Cardiology and Sleep Related History:    DME: [facility]    Cardiology/Sleep History  1. Suspected CAD with abnormal stress test -fixed inferior defect on several nuclear stress tests, last  at [facility]  2. CARMELA with AHI 55 on HST  - on bipap but mostly intolerant   3. DVT/PE - , unprovoked  4. COPD  5. DMT2  6. Echo 2022 - Ef 55-60%, RVSP 45mmHg    2024 EKG-sinus rhythm.  Incomplete right bundle branch block.  Heart rate 95.    3/ labs-A1c 6.8, cholesterol 178, triglycerides 206, HDL 54, LDL 89, creatinine 0.56, potassium normal, sodium normal, TSH and T4 free normal              Allergies   Allergen Reactions    Valacyclovir Hives    Penicillins Other (See Comments)     Patient is unknown. It's been awhile since he's taken it.       Current Outpatient Medications:     albuterol sulfate  (90 Base) MCG/ACT inhaler, , Disp: , Rfl:     Eliquis 5 MG tablet tablet, TAKE 1 TABLET BY MOUTH EVERY 12 HOURS, Disp: 180 tablet, Rfl: 3    fluticasone (FLONASE) 50 MCG/ACT nasal spray, 2 sprays into the nostril(s) as directed by provider Daily., Disp: 16 g, Rfl: 11    furosemide (LASIX) 40 MG tablet, Take 1 tablet by mouth Daily., Disp: , Rfl:     Homeopathic Products (ALLERGY BUSTER NA), 1 tablet/day into the nostril(s) as directed by provider Daily., Disp: , Rfl:     metFORMIN  "(GLUCOPHAGE) 500 MG tablet, Take 1 tablet by mouth Daily., Disp: , Rfl:     metoprolol succinate XL (TOPROL-XL) 25 MG 24 hr tablet, Take 1 tablet by mouth once daily, Disp: 90 tablet, Rfl: 3    montelukast (SINGULAIR) 10 MG tablet, Take 1 tablet by mouth Daily., Disp: , Rfl:     potassium chloride (KLOR-CON M20) 20 MEQ CR tablet, Take 1 tablet by mouth once daily, Disp: 90 tablet, Rfl: 0    simvastatin (ZOCOR) 80 MG tablet, Take 1 tablet by mouth Daily., Disp: , Rfl:     Trelegy Ellipta 100-62.5-25 MCG/ACT inhaler, Inhale 1 puff Daily., Disp: , Rfl:     PMH: Reviewed and updated in Epic    PSH:Reviewed and updated in Epic    FAMHX:Reviewed and updated in Epic    SOCHX:Reviewed and updated in Epic    Vital Signs:  /72 (BP Location: Right arm, Patient Position: Sitting, Cuff Size: Adult)   Pulse 96   Ht 172.7 cm (68\")   Wt 105 kg (231 lb)   SpO2 94%   BMI 35.12 kg/m²   Estimated body mass index is 35.12 kg/m² as calculated from the following:    Height as of this encounter: 172.7 cm (68\").    Weight as of this encounter: 105 kg (231 lb).     Physical Exam  Vitals reviewed.   Constitutional:       General: He is not in acute distress.  HENT:      Head: Normocephalic.   Eyes:      General: No scleral icterus.  Cardiovascular:      Rate and Rhythm: Normal rate.   Pulmonary:      Effort: Pulmonary effort is normal.   Musculoskeletal:      Cervical back: Normal range of motion.   Neurological:      Mental Status: He is alert and oriented to person, place, and time.   Psychiatric:         Mood and Affect: Mood normal.         Thought Content: Thought content normal.           Assessment and Plan     Diagnoses and all orders for this visit:    1. CARMELA (obstructive sleep apnea) (Primary)  Assessment & Plan:  Wants EPAP min changed from 10 to 8.  Will send in order and recheck in 6 weeks.    Orders:  -     PAP Therapy        Recommendations: ER if symptoms increase, Report if any new/changing symptoms immediately, " Sleep risks reviewed (driving, medical, sleep death, sedating agents), Sleep hygiene discussed, Increase pap therapy usage, and CARMELA precautions for anesthesia    Follow Up  Return in about 6 weeks (around 12/10/2024).    Lisset Vang, APRN   10/02/2024     Please note that this explicitly excludes time spent on other separate billable services such as performing procedures or test interpretation, when applicable.  This note was created using dictation software which occasionally transcribes nonsensical phrases. Please contact the provider if any clarification is needed.

## 2024-12-16 RX ORDER — POTASSIUM CHLORIDE 1500 MG/1
TABLET, EXTENDED RELEASE ORAL DAILY
Qty: 90 TABLET | Refills: 1 | Status: SHIPPED | OUTPATIENT
Start: 2024-12-16

## 2025-01-09 RX ORDER — APIXABAN 5 MG/1
5 TABLET, FILM COATED ORAL
Qty: 180 TABLET | Refills: 0 | Status: SHIPPED | OUTPATIENT
Start: 2025-01-09

## 2025-01-21 ENCOUNTER — PATIENT ROUNDING (BHMG ONLY) (OUTPATIENT)
Dept: CARDIOLOGY | Facility: CLINIC | Age: 70
End: 2025-01-21

## 2025-01-21 ENCOUNTER — OFFICE VISIT (OUTPATIENT)
Dept: CARDIOLOGY | Facility: CLINIC | Age: 70
End: 2025-01-21
Payer: MEDICARE

## 2025-01-21 VITALS
BODY MASS INDEX: 35.21 KG/M2 | WEIGHT: 232.3 LBS | DIASTOLIC BLOOD PRESSURE: 76 MMHG | OXYGEN SATURATION: 92 % | HEIGHT: 68 IN | HEART RATE: 69 BPM | SYSTOLIC BLOOD PRESSURE: 126 MMHG

## 2025-01-21 DIAGNOSIS — I25.10 CORONARY ARTERY DISEASE INVOLVING NATIVE CORONARY ARTERY OF NATIVE HEART WITHOUT ANGINA PECTORIS: ICD-10-CM

## 2025-01-21 DIAGNOSIS — G47.33 OSA (OBSTRUCTIVE SLEEP APNEA): ICD-10-CM

## 2025-01-21 PROBLEM — E11.9 DM (DIABETES MELLITUS): Status: ACTIVE | Noted: 2022-01-01

## 2025-01-21 PROBLEM — Z86.718 HISTORY OF DEEP VEIN THROMBOSIS (DVT) OF LOWER EXTREMITY: Status: ACTIVE | Noted: 2025-01-21

## 2025-01-21 PROBLEM — Z86.711 HISTORY OF PULMONARY EMBOLUS (PE): Status: ACTIVE | Noted: 2025-01-21

## 2025-01-21 PROCEDURE — 3078F DIAST BP <80 MM HG: CPT | Performed by: NURSE PRACTITIONER

## 2025-01-21 PROCEDURE — 3074F SYST BP LT 130 MM HG: CPT | Performed by: NURSE PRACTITIONER

## 2025-01-21 PROCEDURE — 99213 OFFICE O/P EST LOW 20 MIN: CPT | Performed by: NURSE PRACTITIONER

## 2025-01-21 RX ORDER — CALCIUM CARBONATE 500(1250)
2 TABLET,CHEWABLE ORAL DAILY
COMMUNITY

## 2025-01-21 RX ORDER — POTASSIUM CHLORIDE 1500 MG/1
1 TABLET, EXTENDED RELEASE ORAL DAILY
COMMUNITY
End: 2025-01-21

## 2025-01-21 NOTE — PROGRESS NOTES
.My name is Hiwot Hicks and I am the Practice Manager for [facility] [place].    I would like to thank you for being a loyal patient. If you do not mind I would like to ask you a few questions about your recent visit with us.  Please feel free to reply if you wish to provide us with feedback on your first visit with our practice.    First, could you tell me what went well with your recent visit?    Secondly, we are always looking for ways to make our patients' experiences even better.  Do you have any recommendations on ways we may improve?    Finally, overall were you satisfied with your first visit to us as a [facility] facility?    In the next few days, you will be receiving a Patient Experience Survey.      Thank you for taking the time to answer a few questions today.  I hope you have a good day.

## 2025-01-21 NOTE — PROGRESS NOTES
[facility] Provider Note     Date:   2025   Name: Lance Madden  :   1955  PCP: Jens Perez MD         History of Present Illness    Lance Madden is a 69 y.o. male who presents today for follow-up on CARMELA after pressure change.  He feels like lowering the pressure has helped some.  He chronically struggles with BiPAP therapy.  He is using nasal pillows but plans to try FFM (he has one at home) due to the combination of Flonase and nasal pillows burning his nostrils.    He does have mild edema but said it goes down at night and overall has been doing better.  He also goes to the gym with his insurance.    He is doing well on Eliquis for history of DVT/PE.    We will see him back in 6 months.  Sooner if needed.    ROS:   Reports Denies Present but Stable   Chest Pain [] [x] []   Shortness of Air [] [x] []   Palpitations [] [x] []   Edema [] [] [x]   Dizziness [] [x] []         Cardiology and Sleep Related History:    DME: N[facility] Love    Cardiology/Sleep History  1. Suspected CAD with abnormal stress test -fixed inferior defect on several nuclear stress tests, last  at [facility]  2. CARMELA with AHI 55 on HST  - on bipap but mostly intolerant   3. DVT/PE - , unprovoked  4. COPD  5. DMT2  6. Echo 2022 - Ef 55-60%, RVSP 45mmHg    2024 EKG-sinus rhythm.  Incomplete right bundle branch block.  Heart rate 95.    3/ labs-A1c 6.8, cholesterol 178, triglycerides 206, HDL 54, LDL 89, creatinine 0.56, potassium normal, sodium normal, TSH and T4 free normal              Problems Addressed this Visit       CARMELA (obstructive sleep apnea)     He feels like lowering the pressure has helped some.  He chronically struggles with BiPAP therapy.  He is using nasal pillows but plans to try FFM (he has one at home) due to the combination of Flonase and nasal pillows burning his nostrils.         Relevant Orders    PAP Therapy    Coronary artery disease  involving native coronary artery of native heart without angina pectoris     Possible, versus GI attenuation on stress testing.  Patient is asymptomatic, he denies chest pain, shortness of breath or fatigue.             Diagnoses         Codes Comments    CARMELA (obstructive sleep apnea)     ICD-10-CM: G47.33  ICD-9-CM: 327.23     Coronary artery disease involving native coronary artery of native heart without angina pectoris     ICD-10-CM: I25.10  ICD-9-CM: 414.01              Medications Discontinued During This Encounter   Medication Reason    potassium chloride ER (K-TAB) 20 MEQ tablet controlled-release ER tablet *Therapy completed          Allergies   Allergen Reactions    Valacyclovir Hives    Penicillins Other (See Comments)     Patient is unknown. It's been awhile since he's taken it.         Current Outpatient Medications:     albuterol sulfate  (90 Base) MCG/ACT inhaler, , Disp: , Rfl:     calcium carbonate (OS-LUTHER) 1250 (500 Ca) MG chewable tablet, Chew 2 tablets Daily., Disp: , Rfl:     Eliquis 5 MG tablet tablet, TAKE 1 TABLET BY MOUTH EVERY 12 HOURS, Disp: 180 tablet, Rfl: 0    fluticasone (FLONASE) 50 MCG/ACT nasal spray, 2 sprays into the nostril(s) as directed by provider Daily., Disp: 16 g, Rfl: 11    furosemide (LASIX) 40 MG tablet, Take 1 tablet by mouth Daily., Disp: , Rfl:     Homeopathic Products (ALLERGY BUSTER NA), 1 tablet/day into the nostril(s) as directed by provider Daily., Disp: , Rfl:     metFORMIN (GLUCOPHAGE) 500 MG tablet, Take 1 tablet by mouth Daily., Disp: , Rfl:     metoprolol succinate XL (TOPROL-XL) 25 MG 24 hr tablet, Take 1 tablet by mouth once daily, Disp: 90 tablet, Rfl: 3    montelukast (SINGULAIR) 10 MG tablet, Take 1 tablet by mouth Daily., Disp: , Rfl:     potassium chloride (KLOR-CON M20) 20 MEQ CR tablet, Take 1 tablet by mouth once daily, Disp: 90 tablet, Rfl: 1    simvastatin (ZOCOR) 80 MG tablet, Take 1 tablet by mouth Daily., Disp: , Rfl:     Trelegy  "Ellipta 100-62.5-25 MCG/ACT inhaler, Inhale 1 puff Daily., Disp: , Rfl:     Past Medical History:   Diagnosis Date    COPD (chronic obstructive pulmonary disease)     DM (diabetes mellitus) 2022    DVT (deep venous thrombosis) 2016    Hypercholesterolemia     CARMELA (obstructive sleep apnea) 11/9/2023          Patient Active Problem List   Diagnosis    Pre-operative cardiovascular examination    CARMELA (obstructive sleep apnea)    Coronary artery disease involving native coronary artery of native heart without angina pectoris    Hypertension, essential    History of deep vein thrombosis (DVT) of lower extremity    History of pulmonary embolus (PE)    DM (diabetes mellitus)    COPD (chronic obstructive pulmonary disease)    Hypercholesterolemia        Family History   Problem Relation Age of Onset    Alzheimer's disease Mother     COPD Father          family history includes Alzheimer's disease in his mother; COPD in his father.     Social History     Socioeconomic History    Marital status:    Tobacco Use    Smoking status: Never     Passive exposure: Never    Smokeless tobacco: Never   Vaping Use    Vaping status: Never Used   Substance and Sexual Activity    Alcohol use: Never    Drug use: Not Currently    Sexual activity: Defer             Vital Signs:  /76 (BP Location: Right arm, Patient Position: Sitting, Cuff Size: Adult)   Pulse 69   Ht 172.7 cm (68\")   Wt 105 kg (232 lb 4.8 oz)   SpO2 92%   BMI 35.32 kg/m²   Vitals:    01/21/25 1300   Patient Position: Sitting      Estimated body mass index is 35.32 kg/m² as calculated from the following:    Height as of this encounter: 172.7 cm (68\").    Weight as of this encounter: 105 kg (232 lb 4.8 oz).     Physical Exam  Vitals reviewed.   Constitutional:       General: He is not in acute distress.  HENT:      Head: Normocephalic.   Eyes:      General: No scleral icterus.  Cardiovascular:      Rate and Rhythm: Normal rate.   Pulmonary:      Effort: " Pulmonary effort is normal.   Musculoskeletal:         General: Normal range of motion.      Cervical back: Normal range of motion.   Skin:     General: Skin is warm and dry.   Neurological:      Mental Status: He is alert and oriented to person, place, and time.   Psychiatric:         Mood and Affect: Mood normal.         Thought Content: Thought content normal.             Assessment and Plan     Diagnoses and all orders for this visit:    1. CARMELA (obstructive sleep apnea)  Assessment & Plan:  He feels like lowering the pressure has helped some.  He chronically struggles with BiPAP therapy.  He is using nasal pillows but plans to try FFM (he has one at home) due to the combination of Flonase and nasal pillows burning his nostrils.    Orders:  -     PAP Therapy    2. Coronary artery disease involving native coronary artery of native heart without angina pectoris  Assessment & Plan:  Possible, versus GI attenuation on stress testing.  Patient is asymptomatic, he denies chest pain, shortness of breath or fatigue.               Recommendations: ER if symptoms increase, Report if any new/changing symptoms immediately, Sleep risks reviewed (driving, medical, sleep death, sedating agents), Sleep hygiene discussed, Increase pap therapy usage, and CARMELA precautions for anesthesia    Follow Up  Return in about 6 months (around 7/21/2025) for CARMELA/CAD.    Lisset Vang, APRN   01/21/2025     Please note that this explicitly excludes time spent on other separate billable services such as performing procedures or test interpretation, when applicable.  This note was created using dictation software which occasionally transcribes nonsensical phrases. Please contact the provider if any clarification is needed.

## 2025-01-21 NOTE — ASSESSMENT & PLAN NOTE
He feels like lowering the pressure has helped some.  He chronically struggles with BiPAP therapy.  He is using nasal pillows but plans to try FFM (he has one at home) due to the combination of Flonase and nasal pillows burning his nostrils.

## 2025-06-12 RX ORDER — POTASSIUM CHLORIDE 1500 MG/1
TABLET, EXTENDED RELEASE ORAL DAILY
Qty: 90 TABLET | Refills: 0 | Status: SHIPPED | OUTPATIENT
Start: 2025-06-12

## 2025-07-22 ENCOUNTER — TELEPHONE (OUTPATIENT)
Dept: CARDIOLOGY | Facility: CLINIC | Age: 70
End: 2025-07-22

## 2025-07-22 NOTE — TELEPHONE ENCOUNTER
SENT [email or web address] MESSAGE TO PATIENT TO CALL AND RESCHEDULE 07/22/25 APPT IN [place] WITH [facility] NOLEN DUE TO PROVIDER EMERGENCY.  HUB OK TO RELAY AND RESCHEDULE APPT.  THANK YOU.

## 2025-07-23 NOTE — TELEPHONE ENCOUNTER
PT HAS A 1 YEAR FU APPT SCHEDULED WITH INNA NOLEN ON 08/26/2025 IN [place].  PT STATED THAT HE OWNS HIS CPAP MACHINE OUTRIGHT AND IS NOT SURE IF THIS APPT IS NEEDED.  PLEASE CALL PT TO DISCUSS REASON FOR APPT.  THANK YOU.

## 2025-08-26 ENCOUNTER — OFFICE VISIT (OUTPATIENT)
Dept: CARDIOLOGY | Facility: CLINIC | Age: 70
End: 2025-08-26
Payer: MEDICARE

## 2025-08-26 ENCOUNTER — PATIENT ROUNDING (BHMG ONLY) (OUTPATIENT)
Dept: CARDIOLOGY | Facility: CLINIC | Age: 70
End: 2025-08-26

## 2025-08-26 VITALS
OXYGEN SATURATION: 92 % | HEIGHT: 68 IN | HEART RATE: 70 BPM | WEIGHT: 223 LBS | RESPIRATION RATE: 20 BRPM | SYSTOLIC BLOOD PRESSURE: 116 MMHG | DIASTOLIC BLOOD PRESSURE: 74 MMHG | BODY MASS INDEX: 33.8 KG/M2

## 2025-08-26 DIAGNOSIS — Z86.711 HISTORY OF PULMONARY EMBOLUS (PE): ICD-10-CM

## 2025-08-26 DIAGNOSIS — G47.33 OSA (OBSTRUCTIVE SLEEP APNEA): Primary | ICD-10-CM

## 2025-08-26 DIAGNOSIS — Z86.718 HISTORY OF DEEP VEIN THROMBOSIS (DVT) OF LOWER EXTREMITY: ICD-10-CM

## 2025-08-26 DIAGNOSIS — I25.10 CORONARY ARTERY DISEASE INVOLVING NATIVE CORONARY ARTERY OF NATIVE HEART WITHOUT ANGINA PECTORIS: ICD-10-CM

## 2025-08-26 DIAGNOSIS — E78.00 HYPERCHOLESTEROLEMIA: ICD-10-CM

## 2025-08-26 DIAGNOSIS — I10 HYPERTENSION, ESSENTIAL: ICD-10-CM

## 2025-08-26 PROCEDURE — 99213 OFFICE O/P EST LOW 20 MIN: CPT | Performed by: NURSE PRACTITIONER

## 2025-08-26 PROCEDURE — 3078F DIAST BP <80 MM HG: CPT | Performed by: NURSE PRACTITIONER

## 2025-08-26 PROCEDURE — 93000 ELECTROCARDIOGRAM COMPLETE: CPT | Performed by: NURSE PRACTITIONER

## 2025-08-26 PROCEDURE — 3074F SYST BP LT 130 MM HG: CPT | Performed by: NURSE PRACTITIONER
